# Patient Record
(demographics unavailable — no encounter records)

---

## 2025-01-11 NOTE — DISCUSSION/SUMMARY
[FreeTextEntry1] : Objective:   - **Diagnostic Results:** Rapid flu test is POSITIVE FOR FLU A.  Rapid strep is negative and sent a swab for strep testing. The results will be ready by Monday and will update accordingly.  Assessment and Plan:   - **Influenza:** Based on the rapid flu test being positive and her symptoms, the patient has the flu. The throat is red and a throat culture is pending to rule out strep throat as well.     - Therapeutic Interventions: Continue with the ibuprofen/tylenol for pain relief and fever. Tamiflu was prescribed to be started today and taken with food. If the throat culture is positive the patient will also need an antibiotic.     - Diagnostic Tests: A throat swab was collected for strep testing. Results will be available by Monday.      - Patient Education: Doctor discussed medication dosages with the parent. Fever control due to chances of prolonged illness due to the flu.     - Follow-Up: Doctor will call with the strep results. If positive or if new symptoms such as ear pain develop, a return visit will be required.

## 2025-01-11 NOTE — PHYSICAL EXAM
[Alert] : alert [Tired appearing] : tired appearing [Clear] : right tympanic membrane clear [Clear Rhinorrhea] : clear rhinorrhea [Erythematous Oropharynx] : erythematous oropharynx [Supple] : supple [NL] : warm, clear [Acute Distress] : no acute distress [Lethargic] : not lethargic [Toxic] : not toxic [Enlarged Tonsils] : tonsils not enlarged [Vesicles] : no vesicles [Exudate] : no exudate [Palate petechiae] : palate without petechiae

## 2025-02-03 NOTE — HISTORY OF PRESENT ILLNESS
[EENT/Resp Symptoms] : EENT/RESPIRATORY SYMPTOMS [___ Day(s)] : [unfilled] day(s) [Fever] : fever [Nasal Congestion] : nasal congestion [Sore Throat] : sore throat [Max Temp: ____] : Max temperature: [unfilled] [Vomiting] : no vomiting [Diarrhea] : no diarrhea [Rash] : no rash

## 2025-02-03 NOTE — DISCUSSION/SUMMARY
[FreeTextEntry1] : Assessment and Plan:   - **Viral Upper Respiratory Infection with Recurrent Fever:** Based on the negative rapid tests for flu and strep, and the recent history of viral illness, the most likely diagnosis is a viral upper respiratory infection causing recurrent fever. The persistence of symptoms could be due to a different strain of flu or another viral pathogen.     - Prescribe an antibiotic to be filled only if the throat culture comes back positive for strep      - Instruct parents to check the patient portal for throat culture results      - Recommend fever control with Tylenol and Motrin as needed      - Advise increased hydration      - Instruct to keep the child home from school until afebrile for 24 hours      - Follow up if symptoms worsen or persist      - Educate on the possibility of multiple strains of flu circulating    - **Possible Strep Throat (Pending Culture):** Although the rapid strep test is negative, there's still a possibility of strep throat given the recent history and recurrent symptoms. Awaiting throat culture results for confirmation.     - Await throat culture results      - Antibiotic prescription sent to pharmacy to be filled only if culture is positive      - Instruct parents to call the emergency line if unable to access results through the portal      - Educate on the importance of completing the full course of antibiotics if prescribed

## 2025-05-08 NOTE — PHYSICAL EXAM
[Alert] : alert [No Acute Distress] : no acute distress [Normocephalic] : normocephalic [Conjunctivae with no discharge] : conjunctivae with no discharge [PERRL] : PERRL [EOMI Bilateral] : EOMI bilateral [Auricles Well Formed] : auricles well formed [Clear Tympanic membranes with present light reflex and bony landmarks] : clear tympanic membranes with present light reflex and bony landmarks [No Discharge] : no discharge [Nares Patent] : nares patent [Pink Nasal Mucosa] : pink nasal mucosa [Palate Intact] : palate intact [Nonerythematous Oropharynx] : nonerythematous oropharynx [Supple, full passive range of motion] : supple, full passive range of motion [No Palpable Masses] : no palpable masses [Symmetric Chest Rise] : symmetric chest rise [Clear to Auscultation Bilaterally] : clear to auscultation bilaterally [Regular Rate and Rhythm] : regular rate and rhythm [Normal S1, S2 present] : normal S1, S2 present [No Murmurs] : no murmurs [+2 Femoral Pulses] : +2 femoral pulses [Soft] : soft [NonTender] : non tender [Non Distended] : non distended [Normoactive Bowel Sounds] : normoactive bowel sounds [No Hepatomegaly] : no hepatomegaly [No Splenomegaly] : no splenomegaly [Nicolás: _____] : Nicolás [unfilled] [Uncircumcised] : uncircumcised [Testicles Descended Bilaterally] : testicles descended bilaterally [Patent] : patent [No fissures] : no fissures [No Abnormal Lymph Nodes Palpated] : no abnormal lymph nodes palpated [No Gait Asymmetry] : no gait asymmetry [No pain or deformities with palpation of bone, muscles, joints] : no pain or deformities with palpation of bone, muscles, joints [Normal Muscle Tone] : normal muscle tone [Straight] : straight [+2 Patella DTR] : +2 patella DTR [Cranial Nerves Grossly Intact] : cranial nerves grossly intact [No Rash or Lesions] : no rash or lesions [FreeTextEntry6] : tight foreskin

## 2025-05-08 NOTE — HISTORY OF PRESENT ILLNESS
[Father] : father [Fruit] : fruit [Vegetables] : vegetables [Meat] : meat [Grains] : grains [Eggs] : eggs [Dairy] : dairy [Eats healthy meals and snacks] : eats healthy meals and snacks [Normal] : Normal [Brushing teeth twice/d] : brushing teeth twice per day [Yes] : Patient goes to dentist yearly [Toothpaste] : Primary Fluoride Source: Toothpaste [Playtime (60 min/d)] : playtime 60 min a day [Participates in after-school activities] : participates in after-school activities [< 2 hrs of screen time per day] : less than 2 hrs of screen time per day [Has Friends] : has friends [Grade ___] : Grade [unfilled] [No difficulties with Homework] : no difficulties with homework [No] : No cigarette smoke exposure [Appropriately restrained in motor vehicle] : appropriately restrained in motor vehicle [Supervised outdoor play] : supervised outdoor play [Supervised around water] : supervised around water [Wears helmet and pads] : wears helmet and pads [Parent knows child's friends] : parent knows child's friends [Parent discusses safety rules regarding adults] : parent discusses safety rules regarding adults [Monitored computer use] : monitored computer use [Family discusses home emergency plan] : family discusses home emergency plan [Exposure to electronic nicotine delivery system] : No exposure to electronic nicotine delivery system

## 2025-05-08 NOTE — DISCUSSION/SUMMARY
[School] : school [Development and Mental Health] : development and mental health [Nutrition and Physical Activity] : nutrition and physical activity [Oral Health] : oral health [Safety] : safety [No Medications] : ~He/She~ is not on any medications [Father] : father [Full Activity without restrictions including Physical Education & Athletics] : Full Activity without restrictions including Physical Education & Athletics [I have examined the above-named student and completed the preparticipation physical evaluation. The athlete does not present apparent clinical contraindications to practice and participate in sport(s) as outlined above. A copy of the physical exam is on r] : I have examined the above-named student and completed the preparticipation physical evaluation. The athlete does not present apparent clinical contraindications to practice and participate in sport(s) as outlined above. A copy of the physical exam is on record in my office and can be made available to the school at the request of the parents. If conditions arise after the athlete has been cleared for participation, the physician may rescind the clearance until the problem is resolved and the potential consequences are completely explained to the athlete (and parents/guardians). [FreeTextEntry1] : Continue balanced diet with all food groups. Brush teeth twice a day with toothbrush. Recommend visit to dentist. Help child to maintain consistent daily routines and sleep schedule. School discussed. Ensure home is safe. Teach child about personal safety. Use consistent, positive discipline. Limit screen time to no more than 2 hours per day. Encourage physical activity. Child needs to ride in a belt-positioning booster seat until  4 feet 9 inches has been reached and are between 8 and 12 years of age.   Return 1 year for routine well child check.